# Patient Record
Sex: FEMALE
[De-identification: names, ages, dates, MRNs, and addresses within clinical notes are randomized per-mention and may not be internally consistent; named-entity substitution may affect disease eponyms.]

---

## 2021-09-14 PROBLEM — Z00.00 ENCOUNTER FOR PREVENTIVE HEALTH EXAMINATION: Status: ACTIVE | Noted: 2021-09-14

## 2021-09-15 ENCOUNTER — APPOINTMENT (OUTPATIENT)
Dept: OTOLARYNGOLOGY | Facility: CLINIC | Age: 31
End: 2021-09-15
Payer: COMMERCIAL

## 2021-09-15 DIAGNOSIS — Z71.89 OTHER SPECIFIED COUNSELING: ICD-10-CM

## 2021-09-15 DIAGNOSIS — R43.0 ANOSMIA: ICD-10-CM

## 2021-09-15 DIAGNOSIS — R43.1 PAROSMIA: ICD-10-CM

## 2021-09-15 PROCEDURE — 99203 OFFICE O/P NEW LOW 30 MIN: CPT | Mod: 95

## 2021-10-25 NOTE — PHYSICAL EXAM
Clear
[FreeTextEntry1] : The patient's evaluation today was done by telemedicine. [facility] and visual communication.  Consent was obtained\par \par The time for the visit was 35 minutes.\par General:\par The patient was alert and oriented and in no distress.\par Voice was clear.\par \par No other evaluation could be done via telemedicine. I reviewed with her at length the pathophysiology. I again explained spell therapy and hopefully she can still have some improvement.\par I explained the regeneration of nerves in how these are regenerating incorrectly at this time.\par I will send her smell test for quantification.   Other treatments that have been done were reviewed but not necessarily recommended.\par \par

## 2021-10-25 NOTE — HISTORY OF PRESENT ILLNESS
[de-identified] : MACI BLAKELY is a 31 year old female who comes in complaining of Parosmia.  She had the corona virus in December of 2020.She developed a relatively complete and nausea for 3-1/2 months and since then she has developed nausea. She was doing spell therapy and this seemed to develop even during this therapy. Otherwise she is well.The patient had no other ear nose or throat complaints at this visit.\par

## 2021-10-25 NOTE — ADDENDUM
[FreeTextEntry1] : 10/25/21  [facility] Testing was done. The patient had  10   out over 12 correct responses which puts the patient in the    15th        percentile for age and sex\par \par RLP\par \par Would re test in 3 months.  RLP